# Patient Record
Sex: MALE
[De-identification: names, ages, dates, MRNs, and addresses within clinical notes are randomized per-mention and may not be internally consistent; named-entity substitution may affect disease eponyms.]

---

## 2021-05-10 PROBLEM — Z00.00 ENCOUNTER FOR PREVENTIVE HEALTH EXAMINATION: Status: ACTIVE | Noted: 2021-05-10

## 2021-06-11 NOTE — REASON FOR VISIT
[Home] : at home, [unfilled] , at the time of the visit. [Medical Office: ([facility])___] : at the medical office located in  [Verbal consent obtained from patient] : the patient, [unfilled]

## 2021-06-15 ENCOUNTER — APPOINTMENT (OUTPATIENT)
Dept: NEUROSURGERY | Facility: CLINIC | Age: 31
End: 2021-06-15
Payer: OTHER GOVERNMENT

## 2021-06-15 PROCEDURE — 99203 OFFICE O/P NEW LOW 30 MIN: CPT | Mod: 95

## 2021-06-15 NOTE — HISTORY OF PRESENT ILLNESS
[FreeTextEntry1] : Pulsatile Tinnitus [de-identified] : Pulsatile tinnitus April 2020\par - primarily on RIGHT \par - pressure/ fullness RIGHT ear\par - worse when laying flat\par \par Bilateral headaches in temporal regions\par - feels the pulse from STA\par \par Occasional Vertigo\par \par March 2020 - suspected infection with Covid\par \par Saw Dr. Forrester\par - normal work-up \par \par MRI Brain IAC\par CT Temporal bone\par

## 2021-06-15 NOTE — PLAN
[FreeTextEntry1] : LEFT pulsatile tinnitus\par \par We discussed possible causes of pulsatile tinnitus. These include:\par ENT causes such as semicircular canal dehiscence, tumor.\par Cardiac causes such as aortic stenosis, valve disease, other heart murmur.\par Hypertension.\par Anemia.\par Thyroid disease.\par Cerebrovascular problems: arteriovenous malformation (AVM), dural arteriovenous fistula (DAVF), Venous sinus stenosis, venous aneurysm, large trans-mastoid emissary vein, carotid stenosis.\par Idiopathic Intracranial Hypertension\par \par Work-up so far negative but needs better cerebrovascular imaging\par \par PLAN\par MRA with and without contrast (TRICKS) to rule out occult  dural arteriovenous fistula (DAVF)\par Consideration for CT Venogram head and neck after the MRA\par Consideration for Catheter Angiogram

## 2021-06-27 ENCOUNTER — TRANSCRIPTION ENCOUNTER (OUTPATIENT)
Age: 31
End: 2021-06-27